# Patient Record
Sex: MALE | Race: WHITE | Employment: FULL TIME | URBAN - METROPOLITAN AREA
[De-identification: names, ages, dates, MRNs, and addresses within clinical notes are randomized per-mention and may not be internally consistent; named-entity substitution may affect disease eponyms.]

---

## 2020-11-26 ENCOUNTER — HOSPITAL ENCOUNTER (EMERGENCY)
Age: 45
Discharge: HOME | End: 2020-11-26
Attending: EMERGENCY MEDICINE
Payer: COMMERCIAL

## 2020-11-26 VITALS
TEMPERATURE: 98.4 F | BODY MASS INDEX: 20.32 KG/M2 | SYSTOLIC BLOOD PRESSURE: 107 MMHG | RESPIRATION RATE: 16 BRPM | WEIGHT: 150 LBS | DIASTOLIC BLOOD PRESSURE: 66 MMHG | OXYGEN SATURATION: 94 % | HEIGHT: 72 IN | HEART RATE: 78 BPM

## 2020-11-26 DIAGNOSIS — S61.210A LACERATION OF RIGHT INDEX FINGER WITHOUT FOREIGN BODY WITHOUT DAMAGE TO NAIL, INITIAL ENCOUNTER: Primary | ICD-10-CM

## 2020-11-26 PROCEDURE — 99283 EMERGENCY DEPT VISIT LOW MDM: CPT

## 2020-11-26 PROCEDURE — 75810000293 HC SIMP/SUPERF WND  RPR

## 2020-11-26 NOTE — ED PROVIDER NOTES
Date of Service:  11/26/2020    Patient:  Joe Kevin    Chief Complaint:  Laceration       HPI:  Sneha Atkinson is a 39 y.o.  male who presents for evaluation of a laceration to the tip of the right second digit. Patient was hand cleaning a knife from the sink when he sliced the end of his finger. Patient immediately washed it applied paper towels and came to the ER. Here, bleeding is stopped. He denies any numbness tingling or loss of movement. Otherwise he believes his tetanus is within the last 10 years. No other acute complaints           History reviewed. No pertinent past medical history. History reviewed. No pertinent surgical history. History reviewed. No pertinent family history. Social History     Socioeconomic History    Marital status: Not on file     Spouse name: Not on file    Number of children: Not on file    Years of education: Not on file    Highest education level: Not on file   Occupational History    Not on file   Social Needs    Financial resource strain: Not on file    Food insecurity     Worry: Not on file     Inability: Not on file    Transportation needs     Medical: Not on file     Non-medical: Not on file   Tobacco Use    Smoking status: Never Smoker    Smokeless tobacco: Never Used   Substance and Sexual Activity    Alcohol use:  Yes     Alcohol/week: 8.0 standard drinks     Types: 8 Glasses of wine per week    Drug use: Never    Sexual activity: Not on file   Lifestyle    Physical activity     Days per week: Not on file     Minutes per session: Not on file    Stress: Not on file   Relationships    Social connections     Talks on phone: Not on file     Gets together: Not on file     Attends Taoist service: Not on file     Active member of club or organization: Not on file     Attends meetings of clubs or organizations: Not on file     Relationship status: Not on file    Intimate partner violence     Fear of current or ex partner: Not on file Emotionally abused: Not on file     Physically abused: Not on file     Forced sexual activity: Not on file   Other Topics Concern    Not on file   Social History Narrative    Not on file         ALLERGIES: Amoxicillin    Review of Systems   Constitutional: Negative for fever. Respiratory: Negative for shortness of breath. Cardiovascular: Negative for chest pain. Gastrointestinal: Negative for abdominal pain. Skin: Positive for wound. Neurological: Negative for numbness. Vitals:    11/26/20 1643   BP: (!) 148/76   Pulse: 78   Resp: 16   Temp: 98.4 °F (36.9 °C)   SpO2: 97%   Weight: 68 kg (150 lb)   Height: 6' (1.829 m)            Physical Exam  Vitals signs and nursing note reviewed. Constitutional:       General: He is not in acute distress. Appearance: Normal appearance. Cardiovascular:      Rate and Rhythm: Normal rate. Pulmonary:      Effort: Pulmonary effort is normal. No respiratory distress. Abdominal:      General: Abdomen is flat. Musculoskeletal: Normal range of motion. Skin:     General: Skin is warm. Capillary Refill: Capillary refill takes less than 2 seconds. Comments: Half centimeter laceration to the distal end of the right second digit, palmar surface. Wound is already closed, no bleeding   Neurological:      Mental Status: He is alert and oriented to person, place, and time. Psychiatric:         Mood and Affect: Mood normal.          MDM  Number of Diagnoses or Management Options  Laceration of right index finger without foreign body without damage to nail, initial encounter:         VITAL SIGNS:  Patient Vitals for the past 4 hrs:   Temp Pulse Resp BP SpO2   11/26/20 1643 98.4 °F (36.9 °C) 78 16 (!) 148/76 97 %         LABS:  No results found for this or any previous visit (from the past 6 hour(s)). IMAGING:  No orders to display         Medications During Visit:  Medications - No data to display      DECISION MAKING:  Nash Gentleman is a 39 y.o. male who comes in as above. Here, wound already appears closed. Here we cleaned it and applied some Dermabond and a tube gauze. Patient given discharge and follow-up instructions. Patient is agreeable to all plans discussed      IMPRESSION:  1. Laceration of right index finger without foreign body without damage to nail, initial encounter        DISPOSITION:  Discharged      There are no discharge medications for this patient. Follow-up Information     Follow up With Specialties Details Why [address] [facility],[address]  Schedule an appointment as soon as possible for a visit   [address] [facility] EMERGENCY DEPT Emergency Medicine  As needed [address]  [phone]            The patient is asked to follow-up with their primary care provider in the next several days. They are to call tomorrow for an appointment. The patient is asked to return promptly for any increased concerns or worsening of symptoms. They can return to this emergency department or any other emergency department. Wound Closure by Adhesive    Date/Time: 11/26/2020 4:50 PM  Performed by: Jennifer Fuentes DO  Authorized by: Jennifer Fuentes DO     Consent:     Consent obtained:  Verbal    Consent given by:  Patient    Risks discussed:  Pain, poor cosmetic result and nerve damage    Alternatives discussed:  No treatment  Anesthesia (see MAR for exact dosages):      Anesthesia method:  None  Laceration details:     Location:  Finger    Finger location:  R index finger    Length (cm):  0.5    Depth (mm):  1  Repair type:     Repair type:  Simple  Exploration:     Wound extent: no foreign bodies/material noted, no tendon damage noted and no underlying fracture noted      Contaminated: no    Treatment:     Area cleansed with:  Ray    Amount of cleaning:  Standard  Skin repair:     Repair method:  Tissue adhesive  Approximation:     Approximation:  Close  Post-procedure details:     Dressing:  Tube gauze    Patient tolerance of procedure:   Tolerated well, no immediate complications

## 2020-11-26 NOTE — ED NOTES
Patient discharged in stable condition by Dr Trenda Goodell. Discharge instructions given and signed.

## 2020-11-26 NOTE — ED TRIAGE NOTES
Patient presents to ED with complaints of laceration to 2nd finger on right hand from knife while washing dishes.